# Patient Record
Sex: FEMALE | Race: WHITE | NOT HISPANIC OR LATINO | Employment: FULL TIME | URBAN - METROPOLITAN AREA
[De-identification: names, ages, dates, MRNs, and addresses within clinical notes are randomized per-mention and may not be internally consistent; named-entity substitution may affect disease eponyms.]

---

## 2023-09-07 ENCOUNTER — HOSPITAL ENCOUNTER (OUTPATIENT)
Dept: DATA CONVERSION | Facility: HOSPITAL | Age: 28
Discharge: HOME | End: 2023-09-07
Payer: COMMERCIAL

## 2023-10-02 ENCOUNTER — TELEPHONE (OUTPATIENT)
Dept: PRIMARY CARE | Facility: CLINIC | Age: 28
End: 2023-10-02
Payer: COMMERCIAL

## 2023-10-02 NOTE — TELEPHONE ENCOUNTER
Pt called in stating she never received the letter from the Dr to her Email. Please advise   Email: [email or web address]

## 2023-10-03 NOTE — TELEPHONE ENCOUNTER
Email sent. Just now through [employer].     TRU PARKER on 10/3/23 at 11:53 AM.

## 2023-10-07 PROBLEM — R51.9 HEADACHE: Status: ACTIVE | Noted: 2023-10-07

## 2023-10-07 PROBLEM — F90.9 ATTENTION DEFICIT DISORDER WITH HYPERACTIVITY: Status: ACTIVE | Noted: 2023-10-07

## 2023-10-07 PROBLEM — F43.10 PTSD (POST-TRAUMATIC STRESS DISORDER): Status: ACTIVE | Noted: 2023-10-07

## 2023-10-07 PROBLEM — E66.01 MORBID OBESITY (MULTI): Status: ACTIVE | Noted: 2023-10-07

## 2023-10-07 PROBLEM — O24.419 GESTATIONAL DIABETES MELLITUS (HHS-HCC): Status: ACTIVE | Noted: 2023-10-07

## 2023-10-07 PROBLEM — J45.909 ASTHMATIC BRONCHITIS (HHS-HCC): Status: ACTIVE | Noted: 2023-10-07

## 2023-10-07 PROBLEM — K08.89 TOOTHACHE: Status: ACTIVE | Noted: 2023-10-07

## 2023-10-07 PROBLEM — D72.829 LEUKOCYTOSIS: Status: ACTIVE | Noted: 2023-10-07

## 2023-10-07 PROBLEM — G89.29 OTHER CHRONIC PAIN: Status: ACTIVE | Noted: 2023-10-07

## 2023-10-07 PROBLEM — F43.9 STRESS: Status: ACTIVE | Noted: 2023-10-07

## 2023-10-07 PROBLEM — R07.89 NON-CARDIAC CHEST PAIN: Status: ACTIVE | Noted: 2023-10-07

## 2023-10-07 PROBLEM — R07.89 TIGHT CHEST: Status: ACTIVE | Noted: 2023-10-07

## 2023-10-07 PROBLEM — J30.2 SEASONAL ALLERGIES: Status: ACTIVE | Noted: 2023-10-07

## 2023-10-07 PROBLEM — M25.511 ARTHRALGIA OF RIGHT ACROMIOCLAVICULAR JOINT: Status: ACTIVE | Noted: 2023-10-07

## 2023-10-07 PROBLEM — J45.909 ASTHMA (HHS-HCC): Status: ACTIVE | Noted: 2023-10-07

## 2023-10-07 PROBLEM — J45.909 ASTHMATIC BRONCHITIS WITHOUT COMPLICATION (HHS-HCC): Status: ACTIVE | Noted: 2023-10-07

## 2023-10-07 RX ORDER — LIDOCAINE 50 MG/G
1 PATCH TOPICAL
COMMUNITY
Start: 2023-08-05

## 2023-10-07 RX ORDER — BUPROPION HYDROCHLORIDE 150 MG/1
150 TABLET ORAL
COMMUNITY
Start: 2023-03-08

## 2023-10-07 RX ORDER — CYCLOBENZAPRINE HCL 10 MG
TABLET ORAL
COMMUNITY
Start: 2023-08-05

## 2023-10-07 RX ORDER — ETONOGESTREL 68 MG/1
IMPLANT SUBCUTANEOUS
COMMUNITY

## 2023-10-07 RX ORDER — MELOXICAM 10 MG/1
1 CAPSULE ORAL EVERY 24 HOURS
COMMUNITY

## 2023-10-07 RX ORDER — LORATADINE 10 MG/1
10 TABLET ORAL EVERY 24 HOURS
COMMUNITY
Start: 2021-10-26

## 2023-10-07 RX ORDER — FLUOXETINE 10 MG/1
10 CAPSULE ORAL DAILY
COMMUNITY
Start: 2023-08-23

## 2023-10-07 RX ORDER — BUSPIRONE HYDROCHLORIDE 15 MG/1
1 TABLET ORAL EVERY 12 HOURS
COMMUNITY

## 2023-10-07 RX ORDER — MELOXICAM 15 MG/1
TABLET ORAL
COMMUNITY
Start: 2023-08-05

## 2023-10-11 NOTE — PROGRESS NOTES
NEW SHOULDER    History: 28-year-old female returns my office today for follow-up of her right shoulder.  Just to review, I have seen her in the past for her right shoulder and we gave her a diagnosis of right AC joint arthralgia we gave her a cortisone injection in that area.  She says that the injection did help the pain periodically, but has now worn off.  We also started her on some physical therapy, she has been only gone to 1 session so far.  In terms of her shoulder, she still has pain over the AC joint.  He still having pain in her cervical area as well.  Denies any numbness or tingling.  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    Past medical history, past surgical history, medications, allergies, family history, social history, and review of systems were reviewed today and have been documented separately in this encounter.   A 12 point review of systems was negative other than as stated in the HPI.    Physical Examination:  Well-appearing, appears stated age, pleasant and cooperative, appropriate mood and behavior. Height and weight reviewed. Alert and oriented x3.  Auditory function intact.  No acute distress.  Intact ocular function, BABS, EOMI. Breathing is unlabored . Full range of motion of the neck in flexion/extension and rotational movements. No significant areas of tenderness to palpation in the neck. There is no evidence of jugular venous distension. Skin appearance is normal without evidence of rash or other lesions. 2+ radial pulses bilaterally, fingers pink and wwp, good capillary refill, no pitting edema. No appreciable lymphadenopathy in bilateral upper extremities. SILT throughout both upper extremities, median/radial/ulnar/musculocutaneous/axillary nerve motor and sensory intact (except for abnormalities noted in focused musculoskeletal exam section below).     On exam of the bilateral  upper extremities, she does have preserved range of motion and strength of both shoulders, but it is very painful on the right side.  Active forward flexion 140, external rotation at 30, internal Tatian L5.  Full strength rotator cuff strength testing.  Exquisite pain over the right AC joint.  Does have some pain in the trapezius and cervical spine as well.    Imaging: Radiographs of the right shoulder performed today.  Personally interpreted by myself.  Preserved glenohumeral joint space.  Preserved acromiohumeral interval.  No acute fractures noted.      Assessment: AC joint pain as well as cervical related pain    Plan: I had a long discussion with the patient again about her treatment and diagnosis.  She continues have dysfunction and pain in her right shoulder.  It is localized to her right AC joint.  She did respond well briefly to a cortisone injection in this area which does confirm the diagnosis.  However, the injection is worn off.  He is also having some cervical related pain which we started some physical therapy for.  She has been working with therapy, but continues have issues with the right shoulder.  I told that at this point I think it is reasonable to get an MRI of the right shoulder to make sure it really is just the AC joint that is inflamed.  We ordered this today.  Furthermore, I want her to continue working with therapy as I do think this will help her neck.  She would like to go back to work, but cannot lift a significant amount of weight, so we provided her a work note stating such.  We will have her follow-up in a month, no new imaging at that time.        Dragon software was used to dictate this note, please be aware that minor errors in transcription may be present.  Marvin Vincent MD    Shoulder/Elbow Surgery  [facility]

## 2023-10-13 ENCOUNTER — OFFICE VISIT (OUTPATIENT)
Dept: ORTHOPEDIC SURGERY | Facility: CLINIC | Age: 28
End: 2023-10-13
Payer: COMMERCIAL

## 2023-10-13 VITALS — BODY MASS INDEX: 39.34 KG/M2 | HEIGHT: 63 IN | WEIGHT: 222 LBS

## 2023-10-13 DIAGNOSIS — M25.511 ARTHRALGIA OF RIGHT ACROMIOCLAVICULAR JOINT: Primary | ICD-10-CM

## 2023-10-13 DIAGNOSIS — M25.511 RIGHT SHOULDER PAIN, UNSPECIFIED CHRONICITY: ICD-10-CM

## 2023-10-13 PROCEDURE — 1036F TOBACCO NON-USER: CPT | Performed by: STUDENT IN AN ORGANIZED HEALTH CARE EDUCATION/TRAINING PROGRAM

## 2023-10-13 PROCEDURE — 99213 OFFICE O/P EST LOW 20 MIN: CPT | Performed by: STUDENT IN AN ORGANIZED HEALTH CARE EDUCATION/TRAINING PROGRAM

## 2023-10-13 ASSESSMENT — PAIN DESCRIPTION - DESCRIPTORS: DESCRIPTORS: THROBBING;TENDER;SHARP

## 2023-10-13 ASSESSMENT — PAIN SCALES - GENERAL: PAINLEVEL_OUTOF10: 8

## 2023-10-13 ASSESSMENT — PAIN - FUNCTIONAL ASSESSMENT: PAIN_FUNCTIONAL_ASSESSMENT: 0-10

## 2023-10-13 NOTE — LETTER
October 13, 2023     Patient: Miguel Angel Thomas   YOB: 1995   Date of Visit: 10/13/2023       To Whom It May Concern:    It is my medical opinion that Miguel Angel Thomas may return to light duty immediately with the following restrictions: 5 pound lifting/carrying restriction . The patient needs to rehab her right shoulder.     If you have any questions or concerns, please don't hesitate to call.         Sincerely,        Marvin Vincent MD    CC: No Recipients

## 2023-11-10 ENCOUNTER — APPOINTMENT (OUTPATIENT)
Dept: RADIOLOGY | Facility: CLINIC | Age: 28
End: 2023-11-10
Payer: COMMERCIAL

## 2023-11-15 ENCOUNTER — APPOINTMENT (OUTPATIENT)
Dept: ORTHOPEDIC SURGERY | Facility: CLINIC | Age: 28
End: 2023-11-15
Payer: COMMERCIAL

## 2023-11-28 ENCOUNTER — APPOINTMENT (OUTPATIENT)
Dept: RADIOLOGY | Facility: CLINIC | Age: 28
End: 2023-11-28
Payer: COMMERCIAL